# Patient Record
Sex: MALE | Race: WHITE | NOT HISPANIC OR LATINO | URBAN - METROPOLITAN AREA
[De-identification: names, ages, dates, MRNs, and addresses within clinical notes are randomized per-mention and may not be internally consistent; named-entity substitution may affect disease eponyms.]

---

## 2017-02-15 ENCOUNTER — GENERIC CONVERSION - ENCOUNTER (OUTPATIENT)
Dept: OTHER | Facility: OTHER | Age: 27
End: 2017-02-15

## 2017-02-15 LAB — ERYTHROCYTE SEDIMENTATION RATE (HISTORICAL): 2 MM/HR (ref 0–15)

## 2017-02-16 LAB
BACTERIA UR QL AUTO: NORMAL
BILIRUB UR QL STRIP: NEGATIVE
COLOR UR: YELLOW
COMMENT (HISTORICAL): CLEAR
FECAL OCCULT BLOOD DIAGNOSTIC (HISTORICAL): NEGATIVE
GLUCOSE (HISTORICAL): NEGATIVE
KETONES UR STRIP-MCNC: NEGATIVE MG/DL
LEUKOCYTE ESTERASE UR QL STRIP: NEGATIVE
LYME IGG/IGM AB (HISTORICAL): <0.91 ISR (ref 0–0.9)
LYME IGM (HISTORICAL): <0.8 INDEX (ref 0–0.79)
MICROSCOPIC EXAMINATION (HISTORICAL): NORMAL
MICROSCOPIC EXAMINATION (HISTORICAL): NORMAL
NITRITE UR QL STRIP: NEGATIVE
NON-SQ EPI CELLS URNS QL MICRO: NORMAL /HPF
PH UR STRIP.AUTO: 7 [PH] (ref 5–7.5)
PROT UR STRIP-MCNC: NEGATIVE MG/DL
RBC (HISTORICAL): NORMAL /HPF
SP GR UR STRIP.AUTO: 1.01 (ref 1–1.03)
URINALYSIS (UA) (HISTORICAL): NORMAL
UROBILINOGEN UR QL STRIP.AUTO: 0.2 EU/DL (ref 0.2–1)
WBC # BLD AUTO: NORMAL /HPF

## 2017-02-21 ENCOUNTER — GENERIC CONVERSION - ENCOUNTER (OUTPATIENT)
Dept: OTHER | Facility: OTHER | Age: 27
End: 2017-02-21

## 2017-07-25 ENCOUNTER — ALLSCRIPTS OFFICE VISIT (OUTPATIENT)
Dept: OTHER | Facility: OTHER | Age: 27
End: 2017-07-25

## 2018-01-14 VITALS
WEIGHT: 169 LBS | BODY MASS INDEX: 24.2 KG/M2 | HEIGHT: 70 IN | TEMPERATURE: 99.2 F | SYSTOLIC BLOOD PRESSURE: 124 MMHG | HEART RATE: 72 BPM | DIASTOLIC BLOOD PRESSURE: 76 MMHG

## 2018-01-17 NOTE — MISCELLANEOUS
Message  patient notified of normal labs       Signatures   Electronically signed by : Fredy Penn DO; Feb 21 2017  5:22PM EST                       (Author)

## 2019-06-21 ENCOUNTER — OFFICE VISIT (OUTPATIENT)
Dept: FAMILY MEDICINE CLINIC | Facility: CLINIC | Age: 29
End: 2019-06-21
Payer: COMMERCIAL

## 2019-06-21 VITALS
OXYGEN SATURATION: 100 % | WEIGHT: 172 LBS | SYSTOLIC BLOOD PRESSURE: 136 MMHG | RESPIRATION RATE: 15 BRPM | HEIGHT: 70 IN | BODY MASS INDEX: 24.62 KG/M2 | HEART RATE: 72 BPM | DIASTOLIC BLOOD PRESSURE: 80 MMHG | TEMPERATURE: 97.9 F

## 2019-06-21 DIAGNOSIS — L50.1 URTICARIA, IDIOPATHIC: Primary | ICD-10-CM

## 2019-06-21 PROCEDURE — 1036F TOBACCO NON-USER: CPT | Performed by: FAMILY MEDICINE

## 2019-06-21 PROCEDURE — 3008F BODY MASS INDEX DOCD: CPT | Performed by: FAMILY MEDICINE

## 2019-06-21 PROCEDURE — 99213 OFFICE O/P EST LOW 20 MIN: CPT | Performed by: FAMILY MEDICINE

## 2019-08-03 LAB
ALBUMIN SERPL-MCNC: 4.5 G/DL (ref 3.5–5.5)
ALBUMIN/GLOB SERPL: 1.9 {RATIO} (ref 1.2–2.2)
ALP SERPL-CCNC: 44 IU/L (ref 39–117)
ALT SERPL-CCNC: 12 IU/L (ref 0–44)
ANA SER QL: NEGATIVE
APPEARANCE UR: CLEAR
AST SERPL-CCNC: 16 IU/L (ref 0–40)
B BURGDOR IGG+IGM SER-ACNC: <0.91 ISR (ref 0–0.9)
BILIRUB SERPL-MCNC: 0.7 MG/DL (ref 0–1.2)
BILIRUB UR QL STRIP: NEGATIVE
BUN SERPL-MCNC: 11 MG/DL (ref 6–20)
BUN/CREAT SERPL: 10 (ref 9–20)
CALCIUM SERPL-MCNC: 9.8 MG/DL (ref 8.7–10.2)
CHLORIDE SERPL-SCNC: 102 MMOL/L (ref 96–106)
CO2 SERPL-SCNC: 24 MMOL/L (ref 20–29)
COLOR UR: YELLOW
CREAT SERPL-MCNC: 1.11 MG/DL (ref 0.76–1.27)
ENDOMYSIUM IGA SER QL: NEGATIVE
ERYTHROCYTE [DISTWIDTH] IN BLOOD BY AUTOMATED COUNT: 13.7 % (ref 12.3–15.4)
ERYTHROCYTE [SEDIMENTATION RATE] IN BLOOD BY WESTERGREN METHOD: 2 MM/HR (ref 0–15)
GLIADIN PEPTIDE IGA SER-ACNC: 3 UNITS (ref 0–19)
GLIADIN PEPTIDE IGG SER-ACNC: 1 UNITS (ref 0–19)
GLOBULIN SER-MCNC: 2.4 G/DL (ref 1.5–4.5)
GLUCOSE SERPL-MCNC: 99 MG/DL (ref 65–99)
GLUCOSE UR QL: NEGATIVE
HCT VFR BLD AUTO: 46 % (ref 37.5–51)
HGB BLD-MCNC: 15.7 G/DL (ref 13–17.7)
HGB UR QL STRIP: NEGATIVE
IGA SERPL-MCNC: 261 MG/DL (ref 90–386)
IRON SERPL-MCNC: 151 UG/DL (ref 38–169)
KETONES UR QL STRIP: NEGATIVE
LEUKOCYTE ESTERASE UR QL STRIP: NEGATIVE
MCH RBC QN AUTO: 30.4 PG (ref 26.6–33)
MCHC RBC AUTO-ENTMCNC: 34.1 G/DL (ref 31.5–35.7)
MCV RBC AUTO: 89 FL (ref 79–97)
MICRO URNS: NORMAL
NITRITE UR QL STRIP: NEGATIVE
PH UR STRIP: 7 [PH] (ref 5–7.5)
PLATELET # BLD AUTO: 223 X10E3/UL (ref 150–450)
POTASSIUM SERPL-SCNC: 4.5 MMOL/L (ref 3.5–5.2)
PROT SERPL-MCNC: 6.9 G/DL (ref 6–8.5)
PROT UR QL STRIP: NEGATIVE
RBC # BLD AUTO: 5.16 X10E6/UL (ref 4.14–5.8)
RHEUMATOID FACT SERPL-ACNC: <10 IU/ML (ref 0–13.9)
SL AMB EGFR AFRICAN AMERICAN: 105 ML/MIN/1.73
SL AMB EGFR NON AFRICAN AMERICAN: 91 ML/MIN/1.73
SODIUM SERPL-SCNC: 141 MMOL/L (ref 134–144)
SP GR UR: 1.01 (ref 1–1.03)
TSH SERPL DL<=0.005 MIU/L-ACNC: 1.85 UIU/ML (ref 0.45–4.5)
TTG IGA SER-ACNC: <2 U/ML (ref 0–3)
TTG IGG SER-ACNC: <2 U/ML (ref 0–5)
UROBILINOGEN UR STRIP-ACNC: 0.2 MG/DL (ref 0.2–1)
WBC # BLD AUTO: 6.2 X10E3/UL (ref 3.4–10.8)

## 2020-08-20 ENCOUNTER — TELEMEDICINE (OUTPATIENT)
Dept: FAMILY MEDICINE CLINIC | Facility: CLINIC | Age: 30
End: 2020-08-20
Payer: COMMERCIAL

## 2020-08-20 VITALS — HEIGHT: 71 IN | BODY MASS INDEX: 24.5 KG/M2 | TEMPERATURE: 97 F | WEIGHT: 175 LBS

## 2020-08-20 DIAGNOSIS — R13.10 SWALLOWING DYSFUNCTION: Primary | ICD-10-CM

## 2020-08-20 PROCEDURE — 1036F TOBACCO NON-USER: CPT | Performed by: FAMILY MEDICINE

## 2020-08-20 PROCEDURE — 3725F SCREEN DEPRESSION PERFORMED: CPT | Performed by: FAMILY MEDICINE

## 2020-08-20 PROCEDURE — 99213 OFFICE O/P EST LOW 20 MIN: CPT | Performed by: FAMILY MEDICINE

## 2020-08-20 PROCEDURE — 3008F BODY MASS INDEX DOCD: CPT | Performed by: FAMILY MEDICINE

## 2020-08-20 RX ORDER — PANTOPRAZOLE SODIUM 20 MG/1
20 TABLET, DELAYED RELEASE ORAL DAILY
Qty: 30 TABLET | Refills: 0 | Status: SHIPPED | OUTPATIENT
Start: 2020-08-20 | End: 2020-09-01

## 2020-08-20 NOTE — PROGRESS NOTES
Virtual Regular Visit      Assessment/Plan:    Problem List Items Addressed This Visit     None      Visit Diagnoses     Swallowing dysfunction    -  Primary    Relevant Medications    pantoprazole (PROTONIX) 20 mg tablet    Other Relevant Orders    Ambulatory referral to Gastroenterology        Patient with longstanding history of swelling issues  Will refer to GI  Pantoprazole ordered for 1 month  Follow-up as needed       Reason for visit is   Chief Complaint   Patient presents with    trouble swallowing    Virtual Regular Visit        Encounter provider Jed Glynn DO    Provider located at [address] [place] [id number] [phone]      Recent Visits  No visits were found meeting these conditions  Showing recent visits within past 7 days and meeting all other requirements     Today's Visits  Date Type Provider Dept   08/20/20 Telemedicine DO Levi Meng   Showing today's visits and meeting all other requirements     Future Appointments  No visits were found meeting these conditions  Showing future appointments within next 150 days and meeting all other requirements        The patient was identified by name and date of birth  Lino Burgess was informed that this is a telemedicine visit and that the visit is being conducted through [facility] and patient was informed that this is a secure, HIPAA-compliant platform  He agrees to proceed     My office door was closed  No one else was in the room  He acknowledged consent and understanding of privacy and security of the video platform  The patient has agreed to participate and understands they can discontinue the visit at any time  Patient is aware this is a billable service  Subjective  Lino Burgess is a 34 y o  male presents today for issues swallowing         Patient presents today was foot problems swallowing  He states that this is been going on for many years  But is got worse recently  Usually starts when he begins eating  But has recently he actually had issues with swallowing water  Occasionally food will get stuck and he will have to the force vomiting to feel better       Past Medical History:   Diagnosis Date    Cryptic tonsil     Last assesed 4/3/2015     Vasovagal syncope     Last assessed 11/28/2016        No past surgical history on file  Current Outpatient Medications   Medication Sig Dispense Refill    pantoprazole (PROTONIX) 20 mg tablet Take 1 tablet (20 mg total) by mouth daily 30 tablet 0     No current facility-administered medications for this visit  No Known Allergies    Review of Systems   Constitutional: Negative  HENT: Negative  Eyes: Negative  Respiratory: Negative  Cardiovascular: Negative  Gastrointestinal: Negative  Endocrine: Negative  Genitourinary: Negative  Musculoskeletal: Negative  Skin: Negative  Allergic/Immunologic: Negative  Neurological: Negative  Hematological: Negative  Psychiatric/Behavioral: Negative  All other systems reviewed and are negative  Video Exam    Vitals:    08/20/20 0747   Temp: (!) 97 °F (36 1 °C)   Weight: 79 4 kg (175 lb)   Height: 5' 11" (1 803 m)       Physical Exam  Vitals signs and nursing note reviewed  Constitutional:       General: He is not in acute distress  Appearance: He is well-developed  HENT:      Head: Normocephalic  Right Ear: External ear normal       Left Ear: External ear normal       Nose: Nose normal    Eyes:      General:         Right eye: No discharge  Left eye: No discharge  Conjunctiva/sclera: Conjunctivae normal    Neck:      Musculoskeletal: Normal range of motion  Pulmonary:      Effort: Pulmonary effort is normal    Abdominal:      General: There is no distension  Tenderness: There is no abdominal tenderness  Musculoskeletal: Normal range of motion     Skin:     General: Skin is warm and dry  Findings: No rash  Neurological:      Mental Status: He is alert and oriented to person, place, and time  Cranial Nerves: No cranial nerve deficit  Psychiatric:         Behavior: Behavior normal          Thought Content: Thought content normal          Judgment: Judgment normal           I spent Fifteen minutes directly with the patient during this visit      [facility] acknowledges that he has consented to an online visit or consultation  He understands that the online visit is based solely on information provided by him, and that, in the absence of a face-to-face physical evaluation by the physician, the diagnosis he receives is both limited and provisional in terms of accuracy and completeness  This is not intended to replace a full medical face-to-face evaluation by the physician  Lori Aly understands and accepts these terms

## 2020-08-25 ENCOUNTER — OFFICE VISIT (OUTPATIENT)
Dept: GASTROENTEROLOGY | Facility: CLINIC | Age: 30
End: 2020-08-25
Payer: COMMERCIAL

## 2020-08-25 VITALS
BODY MASS INDEX: 23.8 KG/M2 | HEIGHT: 71 IN | HEART RATE: 76 BPM | DIASTOLIC BLOOD PRESSURE: 80 MMHG | SYSTOLIC BLOOD PRESSURE: 136 MMHG | WEIGHT: 170 LBS | TEMPERATURE: 97.7 F

## 2020-08-25 DIAGNOSIS — R13.10 DYSPHAGIA, UNSPECIFIED TYPE: Primary | ICD-10-CM

## 2020-08-25 PROCEDURE — 3008F BODY MASS INDEX DOCD: CPT | Performed by: NURSE PRACTITIONER

## 2020-08-25 PROCEDURE — 99244 OFF/OP CNSLTJ NEW/EST MOD 40: CPT | Performed by: NURSE PRACTITIONER

## 2020-08-25 PROCEDURE — 1036F TOBACCO NON-USER: CPT | Performed by: NURSE PRACTITIONER

## 2020-08-25 NOTE — H&P (VIEW-ONLY)
[facility] - Outpatient Consultation  José Miguel Hatfield 34 y o  male MRN: [id number]  Encounter: [id number]    ASSESSMENT AND PLAN:      1  Dysphagia, unspecified type  Solid-food dysphagia for the past several years that has progressively worsened over the past year  Suspect an esophageal stricture or less likely a Schatzki's ring  Consider eosinophilic esophagitis  - arrange for an upper endoscopy with probable esophageal dilatation      Followup Appointment:  After procedure if warranted  ______________________________________________________________________    Chief Complaint   Patient presents with    Dysphagia     soilds       HPI:   José Miguel Hatfield is a 34y o  year old male who presents for consultation in regard to solid-food dysphagia  This has been ongoing for many years but has progressed over the past year  Typically occurring with meat or breads  When this occurs he is not even able to drink water in order to facilitate the passage of the food bolus and will need to vomit up the food  Denies actual heartburn issues or water brash  Denies odynophagia, early satiety, anorexia or weight loss  Denies abdominal pain, change in bowel habits, diarrhea, constipation, melena or rectal bleeding  Denies NSAIDs  Was prescribed Protonix by Dr Chester Ribeiro a few days ago, but has  yet  this medication as he does not have any issues with heartburn      Historical Information   Past Medical History:   Diagnosis Date    Asthma     In childhood    Cryptic tonsil     Last assesed 4/3/2015     Vasovagal syncope     Last assessed 11/28/2016      Past Surgical History:   Procedure Laterality Date    WISDOM TOOTH EXTRACTION       Social History     Substance and Sexual Activity   Alcohol Use Not Currently     Social History     Substance and Sexual Activity   Drug Use Not Currently     Social History     Tobacco Use   Smoking Status Never Smoker   Smokeless Tobacco Never Used     Family History   Problem Relation Age of Onset    Hypertension Mother     Cancer Family     Diabetes Family     Heart disease Family     Hypertension Family     Stroke Family     Colon cancer Neg Hx     Colon polyps Neg Hx        Meds/Allergies     Current Outpatient Medications:     pantoprazole (PROTONIX) 20 mg tablet    No Known Allergies    PHYSICAL EXAM:    Blood pressure 136/80, pulse 76, temperature 97 7 °F (36 5 °C), height 5' 11" (1 803 m), weight 77 1 kg (170 lb)  Body mass index is 23 71 kg/m²  General Appearance: NAD, cooperative, alert  Eyes: Anicteric  ENT:  Normocephalic    Neck:  Appears normal  Resp:  Clear to auscultation bilaterally; no rales, rhonchi or wheezing; respirations unlabored   CV:  S1 S2, Regular rate and rhythm; no murmur, rub, or gallop  GI:  Soft, non-tender, non-distended; normal bowel sounds; no masses, no organomegaly   Rectal: Deferred  Musculoskeletal: No cyanosis, clubbing or edema  Normal ROM  Skin:  No jaundice, rashes, or lesions   Psych: Normal affect, good eye contact  Neuro: No gross deficits, AAOx3    Lab Results:   Lab Results   Component Value Date    WBC 6 2 08/02/2019    HGB 15 7 08/02/2019    HCT 46 0 08/02/2019    MCV 89 08/02/2019     08/02/2019     Lab Results   Component Value Date     02/15/2017    K 4 5 08/02/2019     08/02/2019    CO2 24 08/02/2019    BUN 11 08/02/2019    CREATININE 1 11 08/02/2019    GLUCOSE 102 (H) 02/15/2017    CALCIUM 10 1 02/15/2017    AST 16 08/02/2019    ALT 12 08/02/2019    ALKPHOS 47 02/15/2017    PROT 7 6 02/15/2017    BILITOT 0 6 02/15/2017     Lab Results   Component Value Date    IRON 151 08/02/2019     No results found for: LIPASE    Radiology Results:   No results found  REVIEW OF SYSTEMS:    CONSTITUTIONAL: Denies any fever, chills, rigors, and weight loss  HEENT: No earache or tinnitus  Denies hearing loss or visual disturbances  CARDIOVASCULAR: No chest pain or palpitations  RESPIRATORY: Denies any cough, hemoptysis, shortness of breath or dyspnea on exertion  GASTROINTESTINAL: As noted in the History of Present Illness  GENITOURINARY: No problems with urination  Denies any hematuria or dysuria  NEUROLOGIC: No dizziness or vertigo, denies headaches  MUSCULOSKELETAL: Denies any muscle or joint pain  SKIN: Denies skin rashes or itching  ENDOCRINE: Denies excessive thirst  Denies intolerance to heat or cold  PSYCHOSOCIAL: Denies depression or anxiety  Denies any recent memory loss

## 2020-08-25 NOTE — PROGRESS NOTES
[address] [facility] - Outpatient Consultation  Hiwot Garcia 34 y o  male MRN: [id number]  Encounter: [id number]    ASSESSMENT AND PLAN:      1  Dysphagia, unspecified type  Solid-food dysphagia for the past several years that has progressively worsened over the past year  Suspect an esophageal stricture or less likely a Schatzki's ring  Consider eosinophilic esophagitis  - arrange for an upper endoscopy with probable esophageal dilatation      Followup Appointment:  After procedure if warranted  ______________________________________________________________________    Chief Complaint   Patient presents with    Dysphagia     soilds       HPI:   Hiwot Garcia is a 34y o  year old male who presents for consultation in regard to solid-food dysphagia  This has been ongoing for many years but has progressed over the past year  Typically occurring with meat or breads  When this occurs he is not even able to drink water in order to facilitate the passage of the food bolus and will need to vomit up the food  Denies actual heartburn issues or water brash  Denies odynophagia, early satiety, anorexia or weight loss  Denies abdominal pain, change in bowel habits, diarrhea, constipation, melena or rectal bleeding  Denies NSAIDs  Was prescribed Protonix by Dr Katherine Covarrubias a few days ago, but has  yet  this medication as he does not have any issues with heartburn      Historical Information   Past Medical History:   Diagnosis Date    Asthma     In childhood    Cryptic tonsil     Last assesed 4/3/2015     Vasovagal syncope     Last assessed 11/28/2016      Past Surgical History:   Procedure Laterality Date    WISDOM TOOTH EXTRACTION       Social History     Substance and Sexual Activity   Alcohol Use Not Currently     Social History     Substance and Sexual Activity   Drug Use Not Currently     Social History     Tobacco Use   Smoking Status Never Smoker   Smokeless Tobacco Never Used     Family History   Problem Relation Age of Onset    Hypertension Mother     Cancer Family     Diabetes Family     Heart disease Family     Hypertension Family     Stroke Family     Colon cancer Neg Hx     Colon polyps Neg Hx        Meds/Allergies     Current Outpatient Medications:     pantoprazole (PROTONIX) 20 mg tablet    No Known Allergies    PHYSICAL EXAM:    Blood pressure 136/80, pulse 76, temperature 97 7 °F (36 5 °C), height 5' 11" (1 803 m), weight 77 1 kg (170 lb)  Body mass index is 23 71 kg/m²  General Appearance: NAD, cooperative, alert  Eyes: Anicteric  ENT:  Normocephalic    Neck:  Appears normal  Resp:  Clear to auscultation bilaterally; no rales, rhonchi or wheezing; respirations unlabored   CV:  S1 S2, Regular rate and rhythm; no murmur, rub, or gallop  GI:  Soft, non-tender, non-distended; normal bowel sounds; no masses, no organomegaly   Rectal: Deferred  Musculoskeletal: No cyanosis, clubbing or edema  Normal ROM  Skin:  No jaundice, rashes, or lesions   Psych: Normal affect, good eye contact  Neuro: No gross deficits, AAOx3    Lab Results:   Lab Results   Component Value Date    WBC 6 2 08/02/2019    HGB 15 7 08/02/2019    HCT 46 0 08/02/2019    MCV 89 08/02/2019     08/02/2019     Lab Results   Component Value Date     02/15/2017    K 4 5 08/02/2019     08/02/2019    CO2 24 08/02/2019    BUN 11 08/02/2019    CREATININE 1 11 08/02/2019    GLUCOSE 102 (H) 02/15/2017    CALCIUM 10 1 02/15/2017    AST 16 08/02/2019    ALT 12 08/02/2019    ALKPHOS 47 02/15/2017    PROT 7 6 02/15/2017    BILITOT 0 6 02/15/2017     Lab Results   Component Value Date    IRON 151 08/02/2019     No results found for: LIPASE    Radiology Results:   No results found  REVIEW OF SYSTEMS:    CONSTITUTIONAL: Denies any fever, chills, rigors, and weight loss  HEENT: No earache or tinnitus  Denies hearing loss or visual disturbances  CARDIOVASCULAR: No chest pain or palpitations  RESPIRATORY: Denies any cough, hemoptysis, shortness of breath or dyspnea on exertion  GASTROINTESTINAL: As noted in the History of Present Illness  GENITOURINARY: No problems with urination  Denies any hematuria or dysuria  NEUROLOGIC: No dizziness or vertigo, denies headaches  MUSCULOSKELETAL: Denies any muscle or joint pain  SKIN: Denies skin rashes or itching  ENDOCRINE: Denies excessive thirst  Denies intolerance to heat or cold  PSYCHOSOCIAL: Denies depression or anxiety  Denies any recent memory loss

## 2020-09-01 ENCOUNTER — ANESTHESIA EVENT (OUTPATIENT)
Dept: GASTROENTEROLOGY | Facility: AMBULATORY SURGERY CENTER | Age: 30
End: 2020-09-01

## 2020-09-01 ENCOUNTER — HOSPITAL ENCOUNTER (OUTPATIENT)
Dept: GASTROENTEROLOGY | Facility: AMBULATORY SURGERY CENTER | Age: 30
Discharge: HOME | End: 2020-09-01
Payer: COMMERCIAL

## 2020-09-01 ENCOUNTER — ANESTHESIA (OUTPATIENT)
Dept: GASTROENTEROLOGY | Facility: AMBULATORY SURGERY CENTER | Age: 30
End: 2020-09-01

## 2020-09-01 VITALS
HEART RATE: 68 BPM | RESPIRATION RATE: 17 BRPM | OXYGEN SATURATION: 96 % | SYSTOLIC BLOOD PRESSURE: 111 MMHG | DIASTOLIC BLOOD PRESSURE: 65 MMHG

## 2020-09-01 DIAGNOSIS — R13.10 DYSPHAGIA, UNSPECIFIED TYPE: ICD-10-CM

## 2020-09-01 PROCEDURE — 88305 TISSUE EXAM BY PATHOLOGIST: CPT | Performed by: PATHOLOGY

## 2020-09-01 PROCEDURE — 43450 DILATE ESOPHAGUS 1/MULT PASS: CPT | Performed by: INTERNAL MEDICINE

## 2020-09-01 PROCEDURE — 43239 EGD BIOPSY SINGLE/MULTIPLE: CPT | Performed by: INTERNAL MEDICINE

## 2020-09-01 RX ORDER — SODIUM CHLORIDE 9 MG/ML
50 INJECTION, SOLUTION INTRAVENOUS CONTINUOUS
Status: DISCONTINUED | OUTPATIENT
Start: 2020-09-01 | End: 2020-09-05 | Stop reason: HOSPADM

## 2020-09-01 RX ORDER — PROPOFOL 10 MG/ML
INJECTION, EMULSION INTRAVENOUS AS NEEDED
Status: DISCONTINUED | OUTPATIENT
Start: 2020-09-01 | End: 2020-09-01

## 2020-09-01 RX ADMIN — PROPOFOL 200 MG: 10 INJECTION, EMULSION INTRAVENOUS at 14:08

## 2020-09-01 RX ADMIN — SODIUM CHLORIDE 50 ML/HR: 9 INJECTION, SOLUTION INTRAVENOUS at 13:50

## 2020-09-01 RX ADMIN — PROPOFOL 50 MG: 10 INJECTION, EMULSION INTRAVENOUS at 14:10

## 2020-09-01 RX ADMIN — PROPOFOL 50 MG: 10 INJECTION, EMULSION INTRAVENOUS at 14:13

## 2020-09-01 RX ADMIN — PROPOFOL 50 MG: 10 INJECTION, EMULSION INTRAVENOUS at 14:14

## 2020-09-01 NOTE — ANESTHESIA PREPROCEDURE EVALUATION
Procedure:  EGD    Relevant Problems   GI/HEPATIC   (+) Dysphagia        Physical Exam    Airway    Mallampati score: II         Dental   No notable dental hx     Cardiovascular  Cardiovascular exam normal    Pulmonary  Pulmonary exam normal     Other Findings        Anesthesia Plan  ASA Score- 1     Anesthesia Type- IV sedation with anesthesia with ASA Monitors  Additional Monitors:   Airway Plan:           Plan Factors-    Induction- intravenous  Postoperative Plan-     Informed Consent- Anesthetic plan and risks discussed with patient

## 2020-09-01 NOTE — ANESTHESIA POSTPROCEDURE EVALUATION
Post-Op Assessment Note    CV Status:  Stable  Pain Score: 0    Pain management: adequate     Mental Status:  Sleepy   Hydration Status:  Euvolemic and stable   PONV Controlled:  None   Airway Patency:  Patent      Post Op Vitals Reviewed: Yes      Staff: Anesthesiologist         No complications documented      BP      Temp      Pulse     Resp      SpO2

## 2020-09-01 NOTE — INTERVAL H&P NOTE
H&P reviewed  After examining the patient I find no changes in the patients condition since the H&P had been written      Vitals:    09/01/20 1347   BP: 146/71   Pulse: 86   Resp: 14   SpO2: 99%

## 2020-09-03 ENCOUNTER — TELEPHONE (OUTPATIENT)
Dept: GASTROENTEROLOGY | Facility: CLINIC | Age: 30
End: 2020-09-03

## 2020-09-10 NOTE — RESULT ENCOUNTER NOTE
Left voicemail, no EGD recall  Will discuss further at upcoming visit  If he still symptomatic on a PPI, I recommended a 6 food elimination diet before his visit

## 2020-10-01 ENCOUNTER — TELEMEDICINE (OUTPATIENT)
Dept: GASTROENTEROLOGY | Facility: CLINIC | Age: 30
End: 2020-10-01
Payer: COMMERCIAL

## 2020-10-01 VITALS — HEIGHT: 71 IN | BODY MASS INDEX: 23.8 KG/M2 | WEIGHT: 170 LBS

## 2020-10-01 DIAGNOSIS — K20.0 EOSINOPHILIC ESOPHAGITIS: ICD-10-CM

## 2020-10-01 DIAGNOSIS — R13.19 ESOPHAGEAL DYSPHAGIA: Primary | ICD-10-CM

## 2020-10-01 PROCEDURE — 1036F TOBACCO NON-USER: CPT | Performed by: INTERNAL MEDICINE

## 2020-10-01 PROCEDURE — 99213 OFFICE O/P EST LOW 20 MIN: CPT | Performed by: INTERNAL MEDICINE

## 2020-10-01 RX ORDER — PANTOPRAZOLE SODIUM 20 MG/1
20 TABLET, DELAYED RELEASE ORAL DAILY
Qty: 30 TABLET | Refills: 4 | Status: SHIPPED | OUTPATIENT
Start: 2020-10-01

## 2021-04-01 ENCOUNTER — OFFICE VISIT (OUTPATIENT)
Dept: FAMILY MEDICINE CLINIC | Facility: CLINIC | Age: 31
End: 2021-04-01
Payer: COMMERCIAL

## 2021-04-01 VITALS
OXYGEN SATURATION: 95 % | BODY MASS INDEX: 22.88 KG/M2 | SYSTOLIC BLOOD PRESSURE: 108 MMHG | HEIGHT: 71 IN | RESPIRATION RATE: 18 BRPM | TEMPERATURE: 98.6 F | HEART RATE: 51 BPM | WEIGHT: 163.4 LBS | DIASTOLIC BLOOD PRESSURE: 76 MMHG

## 2021-04-01 DIAGNOSIS — S39.011A STRAIN OF ABDOMINAL MUSCLE, INITIAL ENCOUNTER: Primary | ICD-10-CM

## 2021-04-01 PROBLEM — A69.20 LYME DISEASE: Status: ACTIVE | Noted: 2017-07-25

## 2021-04-01 PROCEDURE — 1036F TOBACCO NON-USER: CPT | Performed by: FAMILY MEDICINE

## 2021-04-01 PROCEDURE — 3725F SCREEN DEPRESSION PERFORMED: CPT | Performed by: FAMILY MEDICINE

## 2021-04-01 PROCEDURE — 99213 OFFICE O/P EST LOW 20 MIN: CPT | Performed by: FAMILY MEDICINE

## 2021-04-01 PROCEDURE — 3008F BODY MASS INDEX DOCD: CPT | Performed by: FAMILY MEDICINE

## 2021-04-01 NOTE — PATIENT INSTRUCTIONS

## 2021-04-01 NOTE — PROGRESS NOTES
Assessment/Plan:       Diagnoses and all orders for this visit:    Strain of abdominal muscle, initial encounter       No signs of hernia  Pain is right over his right lower quadrant  But he does not have any other GI symptoms or signs consistent with appendicitis  His pain also seems to be improving  This is likely an abdominal muscle strain as his history states he was moving heavy objects few days ago  He will follow-up if symptoms worsen      Subjective:     Chief Complaint   Patient presents with    Abdominal Pain     Started on Sunday, patient doing heavy lifting and noticed some pain near his belly button, better today than before, pain more when straining or bending over        Patient ID: Jignesh Allen is a 31 y o  male  Patient is here for abdominal pain  States started on Sunday which was 4 days ago after have moving heavy objects  Pain is right lower quadrant   Worse with movement and bending  Denies any fevers chills body aches or any other GI related symptoms      The following portions of the patient's history were reviewed and updated as appropriate: allergies, current medications, past family history, past medical history, past social history, past surgical history and problem list     Review of Systems   Constitutional: Negative  HENT: Negative  Eyes: Negative  Respiratory: Negative  Cardiovascular: Negative  Gastrointestinal: Positive for abdominal pain  Endocrine: Negative  Genitourinary: Negative  Musculoskeletal: Negative  Skin: Negative  Allergic/Immunologic: Negative  Neurological: Negative  Hematological: Negative  Psychiatric/Behavioral: Negative  All other systems reviewed and are negative          Objective:    Vitals:    04/01/21 1415   BP: 108/76   BP Location: Left arm   Patient Position: Sitting   Cuff Size: Standard   Pulse: (!) 51   Resp: 18   Temp: 98 6 °F (37 °C)   TempSrc: Tympanic   SpO2: 95%   Weight: 74 1 kg (163 lb 6 4 oz) Height: 5' 11" (1 803 m)          Physical Exam  Vitals signs and nursing note reviewed  Constitutional:       General: He is not in acute distress  Appearance: He is well-developed  HENT:      Head: Normocephalic  Right Ear: External ear normal       Left Ear: External ear normal       Nose: Nose normal    Eyes:      General:         Right eye: No discharge  Left eye: No discharge  Conjunctiva/sclera: Conjunctivae normal       Pupils: Pupils are equal, round, and reactive to light  Neck:      Musculoskeletal: Normal range of motion  Cardiovascular:      Rate and Rhythm: Normal rate and regular rhythm  Heart sounds: Normal heart sounds  Pulmonary:      Effort: Pulmonary effort is normal       Breath sounds: Normal breath sounds  Abdominal:      General: Bowel sounds are normal  There is no distension  Palpations: Abdomen is soft  Tenderness: There is abdominal tenderness in the right lower quadrant  Hernia: No hernia is present  Musculoskeletal: Normal range of motion  Skin:     General: Skin is warm and dry  Findings: No rash  Neurological:      Mental Status: He is alert and oriented to person, place, and time  Cranial Nerves: No cranial nerve deficit  Psychiatric:         Behavior: Behavior normal          Thought Content: Thought content normal          Judgment: Judgment normal          BMI Counseling: Body mass index is 22 79 kg/m²  The BMI is above normal  Nutrition recommendations include reducing portion sizes, decreasing overall calorie intake, 3-5 servings of fruits/vegetables daily, reducing fast food intake, consuming healthier snacks, decreasing soda and/or juice intake, moderation in carbohydrate intake, increasing intake of lean protein, reducing intake of saturated fat and trans fat and reducing intake of cholesterol  Exercise recommendations include exercising 3-5 times per week

## 2023-05-31 ENCOUNTER — TELEPHONE (OUTPATIENT)
Dept: FAMILY MEDICINE CLINIC | Facility: CLINIC | Age: 33
End: 2023-05-31

## 2023-05-31 DIAGNOSIS — N46.9 INFERTILITY MALE: Primary | ICD-10-CM

## 2023-05-31 NOTE — TELEPHONE ENCOUNTER
"Patient wants to know if you are able to give him a call real quick  He has a question about \"family planning\" he wanted to ask you about   Please advise  "

## 2023-06-03 ENCOUNTER — NURSE TRIAGE (OUTPATIENT)
Dept: OTHER | Facility: OTHER | Age: 33
End: 2023-06-03

## 2023-06-03 NOTE — TELEPHONE ENCOUNTER
Regarding: TESTICULAR PAIN  ----- Message from Imtiaz Boo sent at 6/3/2023 11:24 AM EDT -----  Patient has been having prolong testicular pain, it is not bad enough for the ER but concern what is going on  Dull pain discomfort - bruising type pain tender

## 2023-06-03 NOTE — TELEPHONE ENCOUNTER
"  Reason for Disposition  • [1] Constant pain in scrotum or testicle AND [2] present > 1 hour    Answer Assessment - Initial Assessment Questions  1  LOCATION and RADIATION: \"Where is the pain located? \"       Started on right now it's the left side too  2  QUALITY: \"What does the pain feel like? \"  (e g , sharp, dull, aching, burning)      Dull   3  SEVERITY: \"How bad is the pain? \"  (Scale 1-10; or mild, moderate, severe)    - MILD (1-3): doesn't interfere with normal activities     - MODERATE (4-7): interferes with normal activities (e g , work or school) or awakens from sleep    - SEVERE (8-10): excruciating pain, unable to do any normal activities, difficulty walking      moderate  4  ONSET: \"When did the pain start? \"      Couple of days   5  PATTERN: \"Does it come and go, or has it been constant since it started? \"      constant  6  SCROTAL APPEARANCE: \"What does the scrotum look like? \" \"Is there any swelling or redness? \"       Normal  7  HERNIA: \"Has a doctor ever told you that you have a hernia? \"      no  8  OTHER SYMPTOMS: \"Do you have any other symptoms? \" (e g , fever, abdominal pain, vomiting, difficulty passing urine)      no    Protocols used: SCROTAL PAIN-ADULT-AH    "

## 2023-06-26 ENCOUNTER — APPOINTMENT (OUTPATIENT)
Dept: LAB | Facility: CLINIC | Age: 33
End: 2023-06-26
Payer: COMMERCIAL

## 2023-06-26 DIAGNOSIS — N46.9 INFERTILITY MALE: ICD-10-CM

## 2023-06-26 LAB
B ABORTUS AB SMN QL AGGL: SLIGHT
COLLECTION DATE & TIME: NORMAL
LIQUEFACTION TIME SMN: 30 MIN
PH SMN: 7.9 [PH] (ref 7.2–8.6)
SEX ABSTIN DURATION TIME PATIENT: 2 D
SPECIMEN VOL SMN: 2.5 ML (ref 1–5)
SPERM # SMN: 640 MILLION/EJACULATION (ref 40–1000)
SPERM AMORPHOUS HEAD NFR SMN: 5 %
SPERM MORPH PNL SMN: 20
SPERM MOTILE SMN QL MICRO: 86 %
SPERM MOTILITY SCORE SMN AUTO: 3 (ref 2–4)
SPERM PROG NFR SMN: 4 % (ref 2–4)
SPERM SMN: 0 %
SPERM SMN: 11 %
SPERM SMN: 2 %
SPERM SMN: 2 %
SPERM SMN: 256 /ML (ref 20–999)
SPERM SMN: 3 %
SPERM SMN: 33 % (ref 0–50)
SPERM SMN: 4 %
SPERM SMN: 6 %
SPERM SMN: 67 % (ref 50–100)
VISC SMN: 4 CP (ref 3–4)
WBC SMN QL: 0 "HPF"

## 2023-06-26 PROCEDURE — 89320 SEMEN ANAL VOL/COUNT/MOT: CPT

## 2023-10-31 ENCOUNTER — TELEPHONE (OUTPATIENT)
Age: 33
End: 2023-10-31

## 2023-10-31 NOTE — TELEPHONE ENCOUNTER
Patient called to schedule with a Derm doctor. When I advised him of our location he chose to try to find a doctor closer to him in [place].

## 2023-12-15 ENCOUNTER — TELEPHONE (OUTPATIENT)
Dept: FAMILY MEDICINE CLINIC | Facility: CLINIC | Age: 33
End: 2023-12-15

## 2023-12-15 NOTE — TELEPHONE ENCOUNTER
Noel for pt and told him I scheduled him for Friday at 11:20 with Garry. Asked that if he cannot make it he can give us a call to reschedule

## 2023-12-18 ENCOUNTER — OFFICE VISIT (OUTPATIENT)
Dept: FAMILY MEDICINE CLINIC | Facility: CLINIC | Age: 33
End: 2023-12-18
Payer: COMMERCIAL

## 2023-12-18 VITALS
HEIGHT: 72 IN | DIASTOLIC BLOOD PRESSURE: 60 MMHG | BODY MASS INDEX: 21.73 KG/M2 | HEART RATE: 84 BPM | TEMPERATURE: 98.7 F | RESPIRATION RATE: 16 BRPM | SYSTOLIC BLOOD PRESSURE: 120 MMHG | WEIGHT: 160.4 LBS | OXYGEN SATURATION: 98 %

## 2023-12-18 DIAGNOSIS — J01.90 ACUTE SINUSITIS WITH SYMPTOMS > 10 DAYS: Primary | ICD-10-CM

## 2023-12-18 DIAGNOSIS — J20.9 ACUTE BRONCHITIS, UNSPECIFIED ORGANISM: ICD-10-CM

## 2023-12-18 PROCEDURE — 99213 OFFICE O/P EST LOW 20 MIN: CPT | Performed by: PHYSICIAN ASSISTANT

## 2023-12-18 RX ORDER — AMOXICILLIN AND CLAVULANATE POTASSIUM 875; 125 MG/1; MG/1
1 TABLET, FILM COATED ORAL EVERY 12 HOURS SCHEDULED
Qty: 14 TABLET | Refills: 0 | Status: SHIPPED | OUTPATIENT
Start: 2023-12-18 | End: 2023-12-25

## 2023-12-18 NOTE — PROGRESS NOTES
Name: Delbert Pop      : 1990      MRN: [id number]  Encounter Provider: Norman Yun PA-C  Encounter Date: 2023   Encounter department: [facility]    Assessment & Plan     1. Acute sinusitis with symptoms > 10 days  -     amoxicillin-clavulanate (AUGMENTIN) 875-125 mg per tablet; Take 1 tablet by mouth every 12 (twelve) hours for 7 days    2. Acute bronchitis, unspecified organism  -     amoxicillin-clavulanate (AUGMENTIN) 875-125 mg per tablet; Take 1 tablet by mouth every 12 (twelve) hours for 7 days    Patient already has steroid pack which was prescribed by another provider.\  Patient to call with any questions, concerns, persistent or worsening symptoms.    Depression Screening and Follow-up Plan: Patient was screened for depression during today's encounter. They screened negative with a PHQ-2 score of 0.        Subjective      HPI  33-year-old male here today for sick appointment.  Patient states for past 3 weeks has been sick with productive cough, wheezing, sinus pressure.  Denies any fevers or chills.  Patient states other family members had similar symptoms around Thanksgiving time.  Denies any chest pain, dyspnea, abdominal pain or GI symptoms.  All other systems negative noncontributory  Review of Systems  As per [facility]  Current Outpatient Medications on File Prior to Visit   Medication Sig    pantoprazole (PROTONIX) 20 mg tablet Take 1 tablet (20 mg total) by mouth daily (Patient not taking: Reported on 2021)       Objective     /60 (BP Location: Left arm, Patient Position: Sitting, Cuff Size: Standard)   Pulse 84   Temp 98.7 °F (37.1 °C) (Tympanic)   Resp 16   Ht 6' (1.829 m)   Wt 72.8 kg (160 lb 6.4 oz)   SpO2 98%   BMI 21.75 kg/m²     Physical Exam  Vitals and nursing note reviewed.   Constitutional:       General: He is not in acute distress.     Appearance: He is not ill-appearing, toxic-appearing or diaphoretic.   HENT:      Head:  Normocephalic.      Right Ear: External ear normal.      Left Ear: External ear normal.      Nose: Congestion present.      Comments: Sinus pressure mostly frontal region  Eyes:      General: No scleral icterus.     Conjunctiva/sclera: Conjunctivae normal.      Pupils: Pupils are equal, round, and reactive to light.   Cardiovascular:      Rate and Rhythm: Normal rate and regular rhythm.      Heart sounds: Normal heart sounds. No murmur heard.  Pulmonary:      Effort: Pulmonary effort is normal.      Breath sounds: Wheezing and rhonchi present.   Abdominal:      General: Bowel sounds are normal.      Palpations: Abdomen is soft.      Tenderness: There is no abdominal tenderness.   Musculoskeletal:      Cervical back: Neck supple.   Lymphadenopathy:      Cervical: No cervical adenopathy.   Neurological:      General: No focal deficit present.      Mental Status: He is alert and oriented to person, place, and time.   Psychiatric:         Mood and Affect: Mood normal.       Norman Yun PA-C

## 2024-01-09 ENCOUNTER — OFFICE VISIT (OUTPATIENT)
Dept: FAMILY MEDICINE CLINIC | Facility: CLINIC | Age: 34
End: 2024-01-09
Payer: COMMERCIAL

## 2024-01-09 VITALS
RESPIRATION RATE: 16 BRPM | HEIGHT: 72 IN | WEIGHT: 163 LBS | OXYGEN SATURATION: 99 % | SYSTOLIC BLOOD PRESSURE: 122 MMHG | BODY MASS INDEX: 22.08 KG/M2 | TEMPERATURE: 97.9 F | HEART RATE: 78 BPM | DIASTOLIC BLOOD PRESSURE: 74 MMHG

## 2024-01-09 DIAGNOSIS — Z23 ENCOUNTER FOR IMMUNIZATION: ICD-10-CM

## 2024-01-09 DIAGNOSIS — Z00.00 ENCOUNTER FOR WELL ADULT EXAM WITHOUT ABNORMAL FINDINGS: Primary | ICD-10-CM

## 2024-01-09 PROCEDURE — 99395 PREV VISIT EST AGE 18-39: CPT | Performed by: FAMILY MEDICINE

## 2024-01-09 PROCEDURE — 90471 IMMUNIZATION ADMIN: CPT

## 2024-01-09 PROCEDURE — 90715 TDAP VACCINE 7 YRS/> IM: CPT

## 2024-01-09 RX ORDER — TRIAMCINOLONE ACETONIDE 0.1 %
PASTE (GRAM) DENTAL
COMMUNITY
Start: 2023-12-04

## 2024-01-09 NOTE — PROGRESS NOTES
Assessment/Plan:     Diagnoses and all orders for this visit:    Encounter for well adult exam without abnormal findings    Encounter for immunization  -     TDAP VACCINE GREATER THAN OR EQUAL TO 8YO IM    Other orders  -     triamcinolone (KENALOG) 0.1 % oral topical paste; APPLY THIN COAT 2 TO 3 TIMES A DAY     Overall patient doing well he had blood work done  For life insurance recently  Adacel given  Up-to-date on rest of health maintenance can follow-up in 1 year or sooner if needed      Subjective:     Chief Complaint   Patient presents with    Annual Exam        Patient ID: Delbert Pop is a 33 y.o. male.    Patient presents today for yearly physical  He is doing well with no acute complaints        The following portions of the patient's history were reviewed and updated as appropriate: allergies, current medications, past family history, past medical history, past social history, past surgical history and problem list.    Review of Systems   Constitutional: Negative.    HENT: Negative.     Eyes: Negative.    Respiratory: Negative.     Cardiovascular: Negative.    Gastrointestinal: Negative.    Endocrine: Negative.    Genitourinary: Negative.    Musculoskeletal: Negative.    Skin: Negative.    Allergic/Immunologic: Negative.    Neurological: Negative.    Hematological: Negative.    Psychiatric/Behavioral: Negative.     All other systems reviewed and are negative.        Objective:    Vitals:    01/09/24 0758   BP: 122/74   BP Location: Left arm   Patient Position: Sitting   Cuff Size: Standard   Pulse: 78   Resp: 16   Temp: 97.9 °F (36.6 °C)   TempSrc: Tympanic   SpO2: 99%   Weight: 73.9 kg (163 lb)   Height: 6' (1.829 m)          Physical Exam  Vitals and nursing note reviewed.   Constitutional:       Appearance: Normal appearance.   HENT:      Head: Normocephalic.      Right Ear: Tympanic membrane, ear canal and external ear normal.      Left Ear: Tympanic membrane, ear canal and external ear  normal.      Nose: Nose normal.      Mouth/Throat:      Mouth: Mucous membranes are moist.   Eyes:      Conjunctiva/sclera: Conjunctivae normal.      Pupils: Pupils are equal, round, and reactive to light.   Cardiovascular:      Rate and Rhythm: Normal rate and regular rhythm.   Pulmonary:      Effort: Pulmonary effort is normal.      Breath sounds: Normal breath sounds.   Abdominal:      General: Abdomen is flat. Bowel sounds are normal.      Palpations: Abdomen is soft.   Musculoskeletal:         General: Normal range of motion.   Skin:     General: Skin is warm and dry.   Neurological:      General: No focal deficit present.      Mental Status: He is alert and oriented to person, place, and time. Mental status is at baseline.   Psychiatric:         Mood and Affect: Mood normal.         Behavior: Behavior normal.         Thought Content: Thought content normal.         Judgment: Judgment normal.

## 2025-03-26 ENCOUNTER — OFFICE VISIT (OUTPATIENT)
Dept: FAMILY MEDICINE CLINIC | Facility: CLINIC | Age: 35
End: 2025-03-26
Payer: COMMERCIAL

## 2025-03-26 VITALS
OXYGEN SATURATION: 100 % | RESPIRATION RATE: 16 BRPM | HEIGHT: 71 IN | HEART RATE: 68 BPM | DIASTOLIC BLOOD PRESSURE: 72 MMHG | SYSTOLIC BLOOD PRESSURE: 126 MMHG | BODY MASS INDEX: 23.15 KG/M2 | TEMPERATURE: 98.6 F | WEIGHT: 165.4 LBS

## 2025-03-26 DIAGNOSIS — J45.909 UNCOMPLICATED ASTHMA, UNSPECIFIED ASTHMA SEVERITY, UNSPECIFIED WHETHER PERSISTENT: ICD-10-CM

## 2025-03-26 DIAGNOSIS — R13.10 DYSPHAGIA, UNSPECIFIED TYPE: Primary | ICD-10-CM

## 2025-03-26 PROCEDURE — 99213 OFFICE O/P EST LOW 20 MIN: CPT | Performed by: FAMILY MEDICINE

## 2025-03-26 NOTE — PROGRESS NOTES
"Name: Delbert Pop      : 1990      MRN: [id number]  Encounter Provider: Ramu Torres DO  Encounter Date: 3/26/2025   Encounter department: [facility]  :  Assessment & Plan  Dysphagia, unspecified type    Orders:    FL barium swallow video w speech; Future    Uncomplicated asthma, unspecified asthma severity, unspecified whether persistent  This is stable       Swallowing evaluation ordered  Will follow-up after evaluation may need to see GI again       Depression Screening and Follow-up Plan: Patient was screened for depression during today's encounter. They screened negative with a PHQ-2 score of 0.        History of Present Illness   Patient presents today for swallowing issues  Patient had swallowing issues in the past he has no longer taking a PPI  He says sometimes food gets stuck  He is wondering if it has to do from him eating fast  No other complaints today      Review of Systems   Constitutional: Negative.    HENT:  Positive for trouble swallowing.    Eyes: Negative.    Respiratory: Negative.     Cardiovascular: Negative.    Gastrointestinal: Negative.    Endocrine: Negative.    Genitourinary: Negative.    Musculoskeletal: Negative.    Skin: Negative.    Allergic/Immunologic: Negative.    Neurological: Negative.    Hematological: Negative.    Psychiatric/Behavioral: Negative.     All other systems reviewed and are negative.      Objective   /72 (BP Location: Left arm, Patient Position: Sitting, Cuff Size: Large)   Pulse 68   Temp 98.6 °F (37 °C) (Tympanic)   Resp 16   Ht 5' 11\" (1.803 m)   Wt 75 kg (165 lb 6.4 oz)   SpO2 100%   BMI 23.07 kg/m²      Physical Exam  Vitals and nursing note reviewed.   Constitutional:       Appearance: Normal appearance. He is well-developed.   HENT:      Head: Normocephalic.      Right Ear: External ear normal.      Left Ear: External ear normal.      Nose: Nose normal.   Eyes:      Conjunctiva/sclera: Conjunctivae normal.      " Pupils: Pupils are equal, round, and reactive to light.   Cardiovascular:      Rate and Rhythm: Normal rate and regular rhythm.      Heart sounds: Normal heart sounds.   Pulmonary:      Effort: Pulmonary effort is normal.      Breath sounds: Normal breath sounds.   Abdominal:      General: Bowel sounds are normal.      Palpations: Abdomen is soft.   Musculoskeletal:         General: Normal range of motion.      Cervical back: Normal range of motion and neck supple.   Skin:     General: Skin is warm and dry.   Neurological:      General: No focal deficit present.      Mental Status: He is alert and oriented to person, place, and time.   Psychiatric:         Behavior: Behavior normal.         Thought Content: Thought content normal.         Judgment: Judgment normal.

## 2025-04-10 ENCOUNTER — HOSPITAL ENCOUNTER (OUTPATIENT)
Dept: RADIOLOGY | Facility: HOSPITAL | Age: 35
End: 2025-04-10
Payer: COMMERCIAL

## 2025-04-10 DIAGNOSIS — R13.10 DYSPHAGIA, UNSPECIFIED TYPE: ICD-10-CM

## 2025-04-10 PROCEDURE — 92611 MOTION FLUOROSCOPY/SWALLOW: CPT

## 2025-04-10 PROCEDURE — 74230 X-RAY XM SWLNG FUNCJ C+: CPT

## 2025-04-10 NOTE — PROCEDURES
Video Swallow Study      Patient Name: Delbert Pop  Today's Date: 4/10/2025        Past Medical History  Past Medical History:   Diagnosis Date    Asthma     In childhood    Cryptic tonsil     Last assesed 4/3/2015     Vasovagal syncope     Last assessed 11/28/2016         Past Surgical History  Past Surgical History:   Procedure Laterality Date    WISDOM TOOTH EXTRACTION       Modified (Video) Barium Swallow Study    Summary:  Images are on PACS for review.     Oral stage: WNL  Adequate labial seal and retrieval. Timely and effective mastication. Adequate AP transfer. No oral residue.     Pharyngeal stage:WNL  Timely pharyngeal swallow. Complete pharyngeal stripping wave. Effective epiglottic closure. No pharyngeal retention/residue. No aspiration or penetration seen throughout study.     Per gross esophageal screen:  Barium tablet not passing through to stomach despite liquid washes and secondary swallow. Slow motility with puree, barium tablet, and regular solid.       Recommendations:  Diet: Regular   Liquids: Thins   Meds: As tolerated   Strategies: Small bites/sips, slow rate of pace,   Frequent/thorough oral care  Consider consult with: GI  If a dedicated assessment of the esophagus is desired:  Consider esophagram/routine barium swallow w/ barium tablet administration, FL Upper GI/UGI,   or EGD (Options may be limited if pt cannot stand/maneuver for barium studies.)      Pt is a 34 year old male referred for VBS secondary to globus sensation. Reported hx of dysphagia and pt no longer taking PPI medication.       Functional Oral Intake Scale (FOIS)  Jaspreet Ardon PhD, F-MIKA  (Does not include liquids)  Nothing by mouth (NPO).  Tube dependent with minimal attempts of food or liquid.  Tube dependent with consistent intake of food or liquid.  Total oral diet of a single consistency.  Total oral diet with multiple consistencies but requiring special preparation or  compensations.  Total oral diet with multiple consistencies without special preparations, but with specific food limitations.  Total oral diet with no restriction.      H&P/pertinent provider notes: (PMH noted above)  Patient presents today for swallowing issues  Patient had swallowing issues in the past he has no longer taking a PPI  He says sometimes food gets stuck  He is wondering if it has to do from him eating fast  No other complaints today    Special Studies:  EGD 2020:  RECOMMENDATION:  Continue pantoprazole 20 mg daily as prescribed by Dr. 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